# Patient Record
Sex: FEMALE | Race: WHITE | Employment: FULL TIME | URBAN - METROPOLITAN AREA
[De-identification: names, ages, dates, MRNs, and addresses within clinical notes are randomized per-mention and may not be internally consistent; named-entity substitution may affect disease eponyms.]

---

## 2021-09-11 ENCOUNTER — OFFICE VISIT (OUTPATIENT)
Dept: FAMILY MEDICINE CLINIC | Facility: CLINIC | Age: 46
End: 2021-09-11
Payer: COMMERCIAL

## 2021-09-11 ENCOUNTER — LAB ENCOUNTER (OUTPATIENT)
Dept: LAB | Age: 46
End: 2021-09-11
Attending: FAMILY MEDICINE
Payer: COMMERCIAL

## 2021-09-11 VITALS
DIASTOLIC BLOOD PRESSURE: 77 MMHG | BODY MASS INDEX: 28 KG/M2 | WEIGHT: 166 LBS | HEART RATE: 76 BPM | SYSTOLIC BLOOD PRESSURE: 139 MMHG | HEIGHT: 64.5 IN

## 2021-09-11 DIAGNOSIS — Z12.11 ENCOUNTER FOR SCREENING COLONOSCOPY: ICD-10-CM

## 2021-09-11 DIAGNOSIS — Z00.00 ANNUAL PHYSICAL EXAM: Primary | ICD-10-CM

## 2021-09-11 DIAGNOSIS — Z00.00 ANNUAL PHYSICAL EXAM: ICD-10-CM

## 2021-09-11 LAB
ALBUMIN SERPL-MCNC: 3.8 G/DL (ref 3.4–5)
ALBUMIN/GLOB SERPL: 1 {RATIO} (ref 1–2)
ALP LIVER SERPL-CCNC: 57 U/L
ALT SERPL-CCNC: 26 U/L
ANION GAP SERPL CALC-SCNC: 4 MMOL/L (ref 0–18)
AST SERPL-CCNC: 15 U/L (ref 15–37)
BASOPHILS # BLD AUTO: 0.05 X10(3) UL (ref 0–0.2)
BASOPHILS NFR BLD AUTO: 1 %
BILIRUB SERPL-MCNC: 0.4 MG/DL (ref 0.1–2)
BUN BLD-MCNC: 11 MG/DL (ref 7–18)
BUN/CREAT SERPL: 12.8 (ref 10–20)
CALCIUM BLD-MCNC: 9.3 MG/DL (ref 8.5–10.1)
CHLORIDE SERPL-SCNC: 106 MMOL/L (ref 98–112)
CHOLEST SMN-MCNC: 244 MG/DL (ref ?–200)
CO2 SERPL-SCNC: 28 MMOL/L (ref 21–32)
CREAT BLD-MCNC: 0.86 MG/DL
DEPRECATED RDW RBC AUTO: 49.4 FL (ref 35.1–46.3)
EOSINOPHIL # BLD AUTO: 0.25 X10(3) UL (ref 0–0.7)
EOSINOPHIL NFR BLD AUTO: 5.1 %
ERYTHROCYTE [DISTWIDTH] IN BLOOD BY AUTOMATED COUNT: 14.6 % (ref 11–15)
GLOBULIN PLAS-MCNC: 3.9 G/DL (ref 2.8–4.4)
GLUCOSE BLD-MCNC: 92 MG/DL (ref 70–99)
HCT VFR BLD AUTO: 40.2 %
HDLC SERPL-MCNC: 78 MG/DL (ref 40–59)
HGB BLD-MCNC: 13.2 G/DL
IMM GRANULOCYTES # BLD AUTO: 0.01 X10(3) UL (ref 0–1)
IMM GRANULOCYTES NFR BLD: 0.2 %
LDLC SERPL CALC-MCNC: 157 MG/DL (ref ?–100)
LYMPHOCYTES # BLD AUTO: 1.65 X10(3) UL (ref 1–4)
LYMPHOCYTES NFR BLD AUTO: 33.3 %
M PROTEIN MFR SERPL ELPH: 7.7 G/DL (ref 6.4–8.2)
MCH RBC QN AUTO: 29.8 PG (ref 26–34)
MCHC RBC AUTO-ENTMCNC: 32.8 G/DL (ref 31–37)
MCV RBC AUTO: 90.7 FL
MONOCYTES # BLD AUTO: 0.43 X10(3) UL (ref 0.1–1)
MONOCYTES NFR BLD AUTO: 8.7 %
NEUTROPHILS # BLD AUTO: 2.56 X10 (3) UL (ref 1.5–7.7)
NEUTROPHILS # BLD AUTO: 2.56 X10(3) UL (ref 1.5–7.7)
NEUTROPHILS NFR BLD AUTO: 51.7 %
NONHDLC SERPL-MCNC: 166 MG/DL (ref ?–130)
OSMOLALITY SERPL CALC.SUM OF ELEC: 285 MOSM/KG (ref 275–295)
PATIENT FASTING Y/N/NP: YES
PATIENT FASTING Y/N/NP: YES
PLATELET # BLD AUTO: 204 10(3)UL (ref 150–450)
POTASSIUM SERPL-SCNC: 4.9 MMOL/L (ref 3.5–5.1)
RBC # BLD AUTO: 4.43 X10(6)UL
RUBV IGG SER QL: POSITIVE
RUBV IGG SER-ACNC: 94.5 IU/ML (ref 10–?)
SODIUM SERPL-SCNC: 138 MMOL/L (ref 136–145)
TRIGL SERPL-MCNC: 56 MG/DL (ref 30–149)
TSI SER-ACNC: 1.18 MIU/ML (ref 0.36–3.74)
VLDLC SERPL CALC-MCNC: 11 MG/DL (ref 0–30)
WBC # BLD AUTO: 5 X10(3) UL (ref 4–11)

## 2021-09-11 PROCEDURE — 80061 LIPID PANEL: CPT

## 2021-09-11 PROCEDURE — 85025 COMPLETE CBC W/AUTO DIFF WBC: CPT

## 2021-09-11 PROCEDURE — 90471 IMMUNIZATION ADMIN: CPT | Performed by: FAMILY MEDICINE

## 2021-09-11 PROCEDURE — 3075F SYST BP GE 130 - 139MM HG: CPT | Performed by: FAMILY MEDICINE

## 2021-09-11 PROCEDURE — 99396 PREV VISIT EST AGE 40-64: CPT | Performed by: FAMILY MEDICINE

## 2021-09-11 PROCEDURE — 84443 ASSAY THYROID STIM HORMONE: CPT

## 2021-09-11 PROCEDURE — 80053 COMPREHEN METABOLIC PANEL: CPT

## 2021-09-11 PROCEDURE — 3078F DIAST BP <80 MM HG: CPT | Performed by: FAMILY MEDICINE

## 2021-09-11 PROCEDURE — 86762 RUBELLA ANTIBODY: CPT

## 2021-09-11 PROCEDURE — 86580 TB INTRADERMAL TEST: CPT | Performed by: FAMILY MEDICINE

## 2021-09-11 PROCEDURE — 86735 MUMPS ANTIBODY: CPT

## 2021-09-11 PROCEDURE — 86765 RUBEOLA ANTIBODY: CPT

## 2021-09-11 PROCEDURE — 86787 VARICELLA-ZOSTER ANTIBODY: CPT

## 2021-09-11 PROCEDURE — 36415 COLL VENOUS BLD VENIPUNCTURE: CPT

## 2021-09-11 PROCEDURE — 90715 TDAP VACCINE 7 YRS/> IM: CPT | Performed by: FAMILY MEDICINE

## 2021-09-11 PROCEDURE — 3008F BODY MASS INDEX DOCD: CPT | Performed by: FAMILY MEDICINE

## 2021-09-11 NOTE — PROGRESS NOTES
Subjective:   Patient ID: Jean-Paul Mcelroy is a 40year old female. Here for pre employment physical. Doing well no complaints. History/Other:   Review of Systems     Constitutional: Negative.   Negative for activity change, appetite change, diaphor (YRP=[id number])

## 2021-09-13 ENCOUNTER — NURSE ONLY (OUTPATIENT)
Dept: FAMILY MEDICINE CLINIC | Facility: CLINIC | Age: 46
End: 2021-09-13
Payer: COMMERCIAL

## 2021-09-13 DIAGNOSIS — Z11.1 SCREENING FOR TUBERCULOSIS: Primary | ICD-10-CM

## 2021-09-13 LAB
INDURATION (): 0 MM (ref 0–11)
MEV IGG SER-ACNC: 20.9 AU/ML (ref 16.5–?)
MUV IGG SER IA-ACNC: 20.9 AU/ML (ref 11–?)
VZV IGG SER IA-ACNC: 546.3 (ref 165–?)

## 2021-09-13 NOTE — PROGRESS NOTES
Patient is here for PPD reading. She has verbalized name and . Patient is here within 48 hours. Copies made and filed out form.

## 2021-09-25 ENCOUNTER — NURSE ONLY (OUTPATIENT)
Dept: FAMILY MEDICINE CLINIC | Facility: CLINIC | Age: 46
End: 2021-09-25
Payer: COMMERCIAL

## 2021-09-25 DIAGNOSIS — Z23 NEED FOR VACCINATION: Primary | ICD-10-CM

## 2021-09-25 PROCEDURE — 90471 IMMUNIZATION ADMIN: CPT | Performed by: STUDENT IN AN ORGANIZED HEALTH CARE EDUCATION/TRAINING PROGRAM

## 2021-09-25 PROCEDURE — 90707 MMR VACCINE SC: CPT | Performed by: STUDENT IN AN ORGANIZED HEALTH CARE EDUCATION/TRAINING PROGRAM

## 2022-11-18 ENCOUNTER — LAB ENCOUNTER (OUTPATIENT)
Dept: LAB | Age: 47
End: 2022-11-18
Attending: FAMILY MEDICINE
Payer: COMMERCIAL

## 2022-11-18 ENCOUNTER — OFFICE VISIT (OUTPATIENT)
Dept: FAMILY MEDICINE CLINIC | Facility: CLINIC | Age: 47
End: 2022-11-18
Payer: COMMERCIAL

## 2022-11-18 VITALS
DIASTOLIC BLOOD PRESSURE: 82 MMHG | WEIGHT: 166 LBS | TEMPERATURE: 98 F | BODY MASS INDEX: 28 KG/M2 | HEIGHT: 64.5 IN | HEART RATE: 67 BPM | SYSTOLIC BLOOD PRESSURE: 144 MMHG | OXYGEN SATURATION: 98 %

## 2022-11-18 DIAGNOSIS — Z00.00 ANNUAL PHYSICAL EXAM: ICD-10-CM

## 2022-11-18 DIAGNOSIS — Z00.00 ANNUAL PHYSICAL EXAM: Primary | ICD-10-CM

## 2022-11-18 DIAGNOSIS — I10 ESSENTIAL HYPERTENSION: ICD-10-CM

## 2022-11-18 DIAGNOSIS — Z12.11 SCREENING FOR COLON CANCER: ICD-10-CM

## 2022-11-18 LAB
ALBUMIN SERPL-MCNC: 3.7 G/DL (ref 3.4–5)
ALBUMIN/GLOB SERPL: 0.9 {RATIO} (ref 1–2)
ALP LIVER SERPL-CCNC: 77 U/L
ALT SERPL-CCNC: 89 U/L
ANION GAP SERPL CALC-SCNC: 4 MMOL/L (ref 0–18)
AST SERPL-CCNC: 36 U/L (ref 15–37)
BASOPHILS # BLD AUTO: 0.05 X10(3) UL (ref 0–0.2)
BASOPHILS NFR BLD AUTO: 0.9 %
BILIRUB SERPL-MCNC: 0.4 MG/DL (ref 0.1–2)
BUN BLD-MCNC: 17 MG/DL (ref 7–18)
BUN/CREAT SERPL: 21.3 (ref 10–20)
CALCIUM BLD-MCNC: 9.4 MG/DL (ref 8.5–10.1)
CHLORIDE SERPL-SCNC: 106 MMOL/L (ref 98–112)
CHOLEST SERPL-MCNC: 216 MG/DL (ref ?–200)
CO2 SERPL-SCNC: 29 MMOL/L (ref 21–32)
CREAT BLD-MCNC: 0.8 MG/DL
DEPRECATED RDW RBC AUTO: 47.6 FL (ref 35.1–46.3)
EOSINOPHIL # BLD AUTO: 0.16 X10(3) UL (ref 0–0.7)
EOSINOPHIL NFR BLD AUTO: 3 %
ERYTHROCYTE [DISTWIDTH] IN BLOOD BY AUTOMATED COUNT: 14.2 % (ref 11–15)
FASTING PATIENT LIPID ANSWER: YES
FASTING STATUS PATIENT QL REPORTED: YES
GFR SERPLBLD BASED ON 1.73 SQ M-ARVRAT: 92 ML/MIN/1.73M2 (ref 60–?)
GLOBULIN PLAS-MCNC: 3.9 G/DL (ref 2.8–4.4)
GLUCOSE BLD-MCNC: 95 MG/DL (ref 70–99)
HCT VFR BLD AUTO: 39.1 %
HDLC SERPL-MCNC: 96 MG/DL (ref 40–59)
HGB BLD-MCNC: 12.8 G/DL
IMM GRANULOCYTES # BLD AUTO: 0.01 X10(3) UL (ref 0–1)
IMM GRANULOCYTES NFR BLD: 0.2 %
LDLC SERPL CALC-MCNC: 113 MG/DL (ref ?–100)
LYMPHOCYTES # BLD AUTO: 1.55 X10(3) UL (ref 1–4)
LYMPHOCYTES NFR BLD AUTO: 28.8 %
MCH RBC QN AUTO: 29.9 PG (ref 26–34)
MCHC RBC AUTO-ENTMCNC: 32.7 G/DL (ref 31–37)
MCV RBC AUTO: 91.4 FL
MONOCYTES # BLD AUTO: 0.4 X10(3) UL (ref 0.1–1)
MONOCYTES NFR BLD AUTO: 7.4 %
NEUTROPHILS # BLD AUTO: 3.22 X10 (3) UL (ref 1.5–7.7)
NEUTROPHILS # BLD AUTO: 3.22 X10(3) UL (ref 1.5–7.7)
NEUTROPHILS NFR BLD AUTO: 59.7 %
NONHDLC SERPL-MCNC: 120 MG/DL (ref ?–130)
OSMOLALITY SERPL CALC.SUM OF ELEC: 289 MOSM/KG (ref 275–295)
PLATELET # BLD AUTO: 230 10(3)UL (ref 150–450)
POTASSIUM SERPL-SCNC: 4.2 MMOL/L (ref 3.5–5.1)
PROT SERPL-MCNC: 7.6 G/DL (ref 6.4–8.2)
RBC # BLD AUTO: 4.28 X10(6)UL
SODIUM SERPL-SCNC: 139 MMOL/L (ref 136–145)
TRIGL SERPL-MCNC: 36 MG/DL (ref 30–149)
VLDLC SERPL CALC-MCNC: 6 MG/DL (ref 0–30)
WBC # BLD AUTO: 5.4 X10(3) UL (ref 4–11)

## 2022-11-18 PROCEDURE — 99396 PREV VISIT EST AGE 40-64: CPT | Performed by: FAMILY MEDICINE

## 2022-11-18 PROCEDURE — 3079F DIAST BP 80-89 MM HG: CPT | Performed by: FAMILY MEDICINE

## 2022-11-18 PROCEDURE — 90686 IIV4 VACC NO PRSV 0.5 ML IM: CPT | Performed by: FAMILY MEDICINE

## 2022-11-18 PROCEDURE — 80061 LIPID PANEL: CPT

## 2022-11-18 PROCEDURE — 36415 COLL VENOUS BLD VENIPUNCTURE: CPT

## 2022-11-18 PROCEDURE — 3077F SYST BP >= 140 MM HG: CPT | Performed by: FAMILY MEDICINE

## 2022-11-18 PROCEDURE — 90471 IMMUNIZATION ADMIN: CPT | Performed by: FAMILY MEDICINE

## 2022-11-18 PROCEDURE — 3008F BODY MASS INDEX DOCD: CPT | Performed by: FAMILY MEDICINE

## 2022-11-18 PROCEDURE — 85025 COMPLETE CBC W/AUTO DIFF WBC: CPT

## 2022-11-18 PROCEDURE — 80053 COMPREHEN METABOLIC PANEL: CPT

## 2022-11-18 NOTE — PROGRESS NOTES
Subjective:   Patient ID: Mary Stevens is a 56year old female. Here for annual physical   otehrwise feeling well       History/Other:   Review of Systems     Constitutional: Negative. Negative for activity change, appetite change, diaphoresis and fatigue. Respiratory: Negative. Negative for apnea, cough, chest tightness and shortness of breath. Cardiovascular: Negative. Negative for chest pain, palpitations and leg swelling. Gastrointestinal: Negative. Negative for abdominal pain. Skin: Negative. Psychiatric/Behavioral: Negative. Allergies:Not on File    Objective:   Physical Exam  Constitutional:       General: She is not in acute distress. Appearance: She is well-developed. HENT:      Head: Normocephalic. Eyes:      General:         Right eye: No discharge. Left eye: No discharge. Neck:      Thyroid: No thyromegaly. Vascular: No JVD. Cardiovascular:      Rate and Rhythm: Normal rate. Heart sounds: Normal heart sounds. Pulmonary:      Effort: No respiratory distress. Breath sounds: Normal breath sounds. No wheezing. Chest:      Chest wall: No tenderness. Abdominal:      General: Bowel sounds are normal. There is no distension. Palpations: Abdomen is soft. There is no mass. Tenderness: There is no abdominal tenderness. There is no guarding or rebound. Hernia: There is no hernia in the left inguinal area. Genitourinary:     Vagina: Normal. No vaginal discharge. Cervix: No cervical motion tenderness, discharge or friability. Uterus: Not deviated, not enlarged, not fixed and not tender. Adnexa:         Right: No mass, tenderness or fullness. Left: No mass, tenderness or fullness. Musculoskeletal:         General: Normal range of motion. Cervical back: Neck supple. Skin:     General: Skin is warm and dry.    Neurological:      Mental Status: She is alert and oriented to person, place, and time.      Deep Tendon Reflexes: Reflexes are normal and symmetric. Psychiatric:         Behavior: Behavior normal.         Thought Content:  Thought content normal.         Assessment & Plan:   Annual physical exam  (primary encounter diagnosis)  Screening for colon cancer  Hypertension   Diet and exercise recommended   F/u in  1month   Orders Placed This Encounter      Comp Metabolic Panel (14)      Lipid Panel      CBC With Differential With Platelet      Hpv Dna  High Risk , Thin Prep Collect      Flulaval 6 months and older 0.5 ml PFS [99996]      ThinPrep PAP Smear      Meds This Visit:  Requested Prescriptions      No prescriptions requested or ordered in this encounter       Imaging & Referrals:  FLULAVAL INFLUENZA VACCINE QUAD PRESERVATIVE FREE 0.5 ML  OP REFERRAL TO [facility] GI TELEPHONE COLON SCREEN  GRETA SCREENING BILAT (CPT=77067)

## 2022-11-21 LAB — HPV I/H RISK 1 DNA SPEC QL NAA+PROBE: NEGATIVE

## 2022-11-28 ENCOUNTER — TELEPHONE (OUTPATIENT)
Dept: FAMILY MEDICINE CLINIC | Facility: CLINIC | Age: 47
End: 2022-11-28

## 2022-11-28 DIAGNOSIS — R79.89 ABNORMAL LFTS: Primary | ICD-10-CM

## 2022-12-03 ENCOUNTER — HOSPITAL ENCOUNTER (OUTPATIENT)
Dept: MAMMOGRAPHY | Age: 47
Discharge: HOME | End: 2022-12-03
Attending: FAMILY MEDICINE
Payer: COMMERCIAL

## 2022-12-03 DIAGNOSIS — Z00.00 ANNUAL PHYSICAL EXAM: ICD-10-CM

## 2022-12-03 PROCEDURE — 77067 SCR MAMMO BI INCL CAD: CPT | Performed by: FAMILY MEDICINE

## 2022-12-03 PROCEDURE — 77063 BREAST TOMOSYNTHESIS BI: CPT | Performed by: FAMILY MEDICINE

## 2022-12-07 ENCOUNTER — HOSPITAL ENCOUNTER (OUTPATIENT)
Dept: MAMMOGRAPHY | Facility: HOSPITAL | Age: 47
Discharge: HOME | End: 2022-12-07
Attending: FAMILY MEDICINE
Payer: COMMERCIAL

## 2022-12-07 ENCOUNTER — HOSPITAL ENCOUNTER (OUTPATIENT)
Dept: ULTRASOUND IMAGING | Facility: HOSPITAL | Age: 47
Discharge: HOME | End: 2022-12-07
Attending: FAMILY MEDICINE
Payer: COMMERCIAL

## 2022-12-07 DIAGNOSIS — R92.8 ABNORMAL MAMMOGRAM: ICD-10-CM

## 2022-12-07 PROCEDURE — 77061 BREAST TOMOSYNTHESIS UNI: CPT | Performed by: FAMILY MEDICINE

## 2022-12-07 PROCEDURE — 77065 DX MAMMO INCL CAD UNI: CPT | Performed by: FAMILY MEDICINE

## 2022-12-07 PROCEDURE — 76642 ULTRASOUND BREAST LIMITED: CPT | Performed by: FAMILY MEDICINE

## 2022-12-08 ENCOUNTER — TELEPHONE (OUTPATIENT)
Dept: FAMILY MEDICINE CLINIC | Facility: CLINIC | Age: 47
End: 2022-12-08

## 2022-12-08 DIAGNOSIS — R92.8 ABNORMAL MAMMOGRAM: Primary | ICD-10-CM

## 2022-12-16 ENCOUNTER — LAB ENCOUNTER (OUTPATIENT)
Dept: LAB | Age: 47
End: 2022-12-16
Attending: FAMILY MEDICINE
Payer: COMMERCIAL

## 2022-12-16 ENCOUNTER — OFFICE VISIT (OUTPATIENT)
Dept: FAMILY MEDICINE CLINIC | Facility: CLINIC | Age: 47
End: 2022-12-16
Payer: COMMERCIAL

## 2022-12-16 VITALS
SYSTOLIC BLOOD PRESSURE: 137 MMHG | HEIGHT: 64.5 IN | DIASTOLIC BLOOD PRESSURE: 87 MMHG | WEIGHT: 168 LBS | BODY MASS INDEX: 28.33 KG/M2 | HEART RATE: 80 BPM

## 2022-12-16 DIAGNOSIS — R79.89 ABNORMAL LFTS: ICD-10-CM

## 2022-12-16 DIAGNOSIS — R39.89 URINARY PROBLEM: ICD-10-CM

## 2022-12-16 DIAGNOSIS — R79.89 ABNORMAL LFTS: Primary | ICD-10-CM

## 2022-12-16 DIAGNOSIS — R89.9 ABNORMAL LABORATORY TEST: ICD-10-CM

## 2022-12-16 LAB
ALBUMIN SERPL-MCNC: 3.9 G/DL (ref 3.4–5)
ALP LIVER SERPL-CCNC: 85 U/L
ALT SERPL-CCNC: 96 U/L
AST SERPL-CCNC: 41 U/L (ref 15–37)
BILIRUB DIRECT SERPL-MCNC: <0.1 MG/DL (ref 0–0.2)
BILIRUB SERPL-MCNC: 0.3 MG/DL (ref 0.1–2)
HCV AB SERPL QL IA: NONREACTIVE
PROT SERPL-MCNC: 8.3 G/DL (ref 6.4–8.2)

## 2022-12-16 PROCEDURE — 80076 HEPATIC FUNCTION PANEL: CPT

## 2022-12-16 PROCEDURE — 3079F DIAST BP 80-89 MM HG: CPT | Performed by: FAMILY MEDICINE

## 2022-12-16 PROCEDURE — 3075F SYST BP GE 130 - 139MM HG: CPT | Performed by: FAMILY MEDICINE

## 2022-12-16 PROCEDURE — 86038 ANTINUCLEAR ANTIBODIES: CPT

## 2022-12-16 PROCEDURE — 3008F BODY MASS INDEX DOCD: CPT | Performed by: FAMILY MEDICINE

## 2022-12-16 PROCEDURE — 36415 COLL VENOUS BLD VENIPUNCTURE: CPT

## 2022-12-16 PROCEDURE — 84466 ASSAY OF TRANSFERRIN: CPT

## 2022-12-16 PROCEDURE — 86803 HEPATITIS C AB TEST: CPT

## 2022-12-16 PROCEDURE — 99213 OFFICE O/P EST LOW 20 MIN: CPT | Performed by: FAMILY MEDICINE

## 2022-12-16 PROCEDURE — 86225 DNA ANTIBODY NATIVE: CPT

## 2022-12-16 PROCEDURE — 83540 ASSAY OF IRON: CPT

## 2022-12-16 PROCEDURE — 86706 HEP B SURFACE ANTIBODY: CPT

## 2022-12-16 NOTE — PROGRESS NOTES
Subjective:   Patient ID: Asuncion Mcnulty is a 56year old female. HPI  Patient here for f/u blood test   Has elevated lfts/   Denies any abdominal pain   Not taking tylenol or drinking alcohol  Also complains about flank pain for quite some time reated to urination   She states that if she does not urinate pain gets worse. History/Other:   Review of Systems     Constitutional: Negative. Negative for activity change, appetite change, diaphoresis and fatigue. Respiratory: Negative. Negative for apnea, cough, chest tightness and shortness of breath. Cardiovascular: Negative. Negative for chest pain, palpitations and leg swelling. Gastrointestinal: Negative. Negative for abdominal pain. Back see hpi     No current outpatient medications on file. Allergies:No Known Allergies    Objective:   Physical Exam  Constitutional:       Appearance: She is well-developed. Cardiovascular:      Rate and Rhythm: Normal rate and regular rhythm. Heart sounds: Normal heart sounds. Pulmonary:      Effort: Pulmonary effort is normal.      Breath sounds: Normal breath sounds. Abdominal:      General: Bowel sounds are normal.      Palpations: Abdomen is soft. Comments: Tenderness in the flank area   Neurological:      Mental Status: She is alert. Deep Tendon Reflexes: Reflexes are normal and symmetric.          Assessment & Plan:   Abnormal LFTs  (primary encounter diagnosis)  Urinary problem  Repeat testing   Us kidney   F/u with results   Discussed about different reasons of the elevated lfts  Orders Placed This Encounter      HCV Antibody [E]      Hepatic Function Panel (7) [E]      Meds This Visit:  Requested Prescriptions      No prescriptions requested or ordered in this encounter       Imaging & Referrals:  US KIDNEYS (DNQ=[id number])

## 2022-12-17 ENCOUNTER — TELEPHONE (OUTPATIENT)
Dept: FAMILY MEDICINE CLINIC | Facility: CLINIC | Age: 47
End: 2022-12-17

## 2022-12-17 DIAGNOSIS — R89.9 ABNORMAL LABORATORY TEST: Primary | ICD-10-CM

## 2022-12-17 LAB
HBV SURFACE AB SER QL: NONREACTIVE
HBV SURFACE AB SERPL IA-ACNC: <3.1 MIU/ML
IRON SATN MFR SERPL: 8 %
IRON SERPL-MCNC: 35 UG/DL
TIBC SERPL-MCNC: 450 UG/DL (ref 240–450)
TRANSFERRIN SERPL-MCNC: 302 MG/DL (ref 200–360)

## 2022-12-19 LAB
DSDNA IGG SERPL IA-ACNC: 1.5 IU/ML
ENA AB SER QL IA: 0.1 UG/L
ENA AB SER QL IA: NEGATIVE

## 2022-12-20 ENCOUNTER — TELEPHONE (OUTPATIENT)
Dept: FAMILY MEDICINE CLINIC | Facility: CLINIC | Age: 47
End: 2022-12-20

## 2022-12-20 DIAGNOSIS — R79.89 ABNORMAL LFTS: Primary | ICD-10-CM

## 2023-01-14 ENCOUNTER — HOSPITAL ENCOUNTER (OUTPATIENT)
Dept: ULTRASOUND IMAGING | Age: 48
Discharge: HOME | End: 2023-01-14
Attending: FAMILY MEDICINE
Payer: COMMERCIAL

## 2023-01-14 DIAGNOSIS — R39.89 URINARY PROBLEM: ICD-10-CM

## 2023-01-14 PROCEDURE — 76775 US EXAM ABDO BACK WALL LIM: CPT | Performed by: FAMILY MEDICINE

## 2023-06-16 ENCOUNTER — HOSPITAL ENCOUNTER (OUTPATIENT)
Dept: ULTRASOUND IMAGING | Facility: HOSPITAL | Age: 48
Discharge: HOME | End: 2023-06-16
Attending: FAMILY MEDICINE
Payer: COMMERCIAL

## 2023-06-16 ENCOUNTER — HOSPITAL ENCOUNTER (OUTPATIENT)
Dept: MAMMOGRAPHY | Facility: HOSPITAL | Age: 48
Discharge: HOME | End: 2023-06-16
Attending: FAMILY MEDICINE
Payer: COMMERCIAL

## 2023-06-16 DIAGNOSIS — R92.8 ABNORMAL MAMMOGRAM: ICD-10-CM

## 2023-06-16 PROCEDURE — 77065 DX MAMMO INCL CAD UNI: CPT | Performed by: FAMILY MEDICINE

## 2023-06-16 PROCEDURE — 76642 ULTRASOUND BREAST LIMITED: CPT | Performed by: FAMILY MEDICINE

## 2023-06-16 PROCEDURE — 77061 BREAST TOMOSYNTHESIS UNI: CPT | Performed by: FAMILY MEDICINE

## 2023-07-05 ENCOUNTER — TELEPHONE (OUTPATIENT)
Dept: FAMILY MEDICINE CLINIC | Facility: CLINIC | Age: 48
End: 2023-07-05

## 2023-07-05 DIAGNOSIS — R92.8 ABNORMAL MAMMOGRAM: Primary | ICD-10-CM

## 2023-07-14 ENCOUNTER — TELEPHONE (OUTPATIENT)
Dept: FAMILY MEDICINE CLINIC | Facility: CLINIC | Age: 48
End: 2023-07-14

## 2023-07-15 ENCOUNTER — WALK IN (OUTPATIENT)
Dept: URGENT CARE | Age: 48
End: 2023-07-15

## 2023-07-15 VITALS
TEMPERATURE: 98.3 F | WEIGHT: 192.68 LBS | HEIGHT: 65 IN | HEART RATE: 86 BPM | RESPIRATION RATE: 16 BRPM | BODY MASS INDEX: 32.1 KG/M2 | DIASTOLIC BLOOD PRESSURE: 58 MMHG | OXYGEN SATURATION: 98 % | SYSTOLIC BLOOD PRESSURE: 110 MMHG

## 2023-07-15 DIAGNOSIS — J01.10 ACUTE NON-RECURRENT FRONTAL SINUSITIS: Primary | ICD-10-CM

## 2023-07-15 PROCEDURE — 99211 OFF/OP EST MAY X REQ PHY/QHP: CPT | Performed by: NURSE PRACTITIONER

## 2023-07-15 RX ORDER — BENZONATATE 100 MG/1
100 CAPSULE ORAL 3 TIMES DAILY PRN
Qty: 20 CAPSULE | Refills: 0 | Status: SHIPPED | OUTPATIENT
Start: 2023-07-15

## 2023-07-15 RX ORDER — AMOXICILLIN AND CLAVULANATE POTASSIUM 875; 125 MG/1; MG/1
1 TABLET, FILM COATED ORAL 2 TIMES DAILY
Qty: 20 TABLET | Refills: 0 | Status: SHIPPED | OUTPATIENT
Start: 2023-07-15 | End: 2023-07-25

## 2023-07-15 ASSESSMENT — ENCOUNTER SYMPTOMS
SINUS PRESSURE: 1
SINUS PAIN: 1
PSYCHIATRIC NEGATIVE: 1
COUGH: 1
HEADACHES: 1
EYES NEGATIVE: 1
ENDOCRINE NEGATIVE: 1
CONSTITUTIONAL NEGATIVE: 1
HEMATOLOGIC/LYMPHATIC NEGATIVE: 1
RHINORRHEA: 1
SORE THROAT: 1
GASTROINTESTINAL NEGATIVE: 1

## 2023-07-29 ENCOUNTER — LAB ENCOUNTER (OUTPATIENT)
Dept: LAB | Age: 48
End: 2023-07-29
Attending: FAMILY MEDICINE
Payer: COMMERCIAL

## 2023-07-29 DIAGNOSIS — R89.9 ABNORMAL LABORATORY TEST: ICD-10-CM

## 2023-07-29 DIAGNOSIS — R79.89 ABNORMAL LFTS: ICD-10-CM

## 2023-07-29 LAB
ALBUMIN SERPL-MCNC: 3.7 G/DL (ref 3.4–5)
ALP LIVER SERPL-CCNC: 60 U/L
ALT SERPL-CCNC: 30 U/L
AST SERPL-CCNC: 21 U/L (ref 15–37)
BILIRUB DIRECT SERPL-MCNC: 0.1 MG/DL (ref 0–0.2)
BILIRUB SERPL-MCNC: 0.3 MG/DL (ref 0.1–2)
DEPRECATED HBV CORE AB SER IA-ACNC: 89.3 NG/ML
PROT SERPL-MCNC: 8.1 G/DL (ref 6.4–8.2)

## 2023-07-29 PROCEDURE — 80076 HEPATIC FUNCTION PANEL: CPT

## 2023-07-29 PROCEDURE — 82728 ASSAY OF FERRITIN: CPT

## 2023-07-29 PROCEDURE — 36415 COLL VENOUS BLD VENIPUNCTURE: CPT

## 2023-08-04 ENCOUNTER — LAB ENCOUNTER (OUTPATIENT)
Dept: LAB | Age: 48
End: 2023-08-04
Attending: FAMILY MEDICINE
Payer: COMMERCIAL

## 2023-08-04 ENCOUNTER — OFFICE VISIT (OUTPATIENT)
Dept: FAMILY MEDICINE CLINIC | Facility: CLINIC | Age: 48
End: 2023-08-04

## 2023-08-04 VITALS
BODY MASS INDEX: 27.55 KG/M2 | OXYGEN SATURATION: 97 % | SYSTOLIC BLOOD PRESSURE: 134 MMHG | HEIGHT: 64.5 IN | RESPIRATION RATE: 14 BRPM | DIASTOLIC BLOOD PRESSURE: 72 MMHG | WEIGHT: 163.38 LBS | HEART RATE: 65 BPM

## 2023-08-04 DIAGNOSIS — R53.83 OTHER FATIGUE: ICD-10-CM

## 2023-08-04 DIAGNOSIS — N92.6 MENSTRUAL DISORDER: ICD-10-CM

## 2023-08-04 DIAGNOSIS — Z12.11 SCREENING FOR COLON CANCER: Primary | ICD-10-CM

## 2023-08-04 LAB
HCG SERPL QL: NEGATIVE
TSI SER-ACNC: 1.43 MIU/ML (ref 0.36–3.74)

## 2023-08-04 PROCEDURE — 99213 OFFICE O/P EST LOW 20 MIN: CPT | Performed by: FAMILY MEDICINE

## 2023-08-04 PROCEDURE — 84703 CHORIONIC GONADOTROPIN ASSAY: CPT

## 2023-08-04 PROCEDURE — 36415 COLL VENOUS BLD VENIPUNCTURE: CPT

## 2023-08-04 PROCEDURE — 84443 ASSAY THYROID STIM HORMONE: CPT

## 2023-08-04 PROCEDURE — 3075F SYST BP GE 130 - 139MM HG: CPT | Performed by: FAMILY MEDICINE

## 2023-08-04 PROCEDURE — 3078F DIAST BP <80 MM HG: CPT | Performed by: FAMILY MEDICINE

## 2023-08-04 PROCEDURE — 3008F BODY MASS INDEX DOCD: CPT | Performed by: FAMILY MEDICINE

## 2023-08-04 NOTE — PROGRESS NOTES
Subjective:   Patient ID: Katie Wolf is a 53year old female. HPI  Patient here for f/u mammogram   Had abnormal mammogram needs repeat in 6 months   Also needs breast exam   Does not feel any mass -she felt something in the past but that's gone   Also feels tired more lately   History/Other:   Review of Systems    Constitutional: Negative. Negative for activity change, appetite change, diaphoresis and fatigue. Breast see hpi   Respiratory: Negative. Negative for apnea, cough, chest tightness and shortness of breath. Cardiovascular: Negative. Negative for chest pain, palpitations and leg swelling. No current outpatient medications on file. Allergies:No Known Allergies    Objective:   Physical Exam  Constitutional:       Appearance: She is well-developed. Cardiovascular:      Rate and Rhythm: Normal rate and regular rhythm. Heart sounds: Normal heart sounds. Pulmonary:      Effort: Pulmonary effort is normal.      Breath sounds: Normal breath sounds. Genitourinary:     Comments: Breast exam no masses no lymphadenopathy  Neurological:      Mental Status: She is alert. Deep Tendon Reflexes: Reflexes are normal and symmetric.          Assessment & Plan:   Screening for colon cancer  (primary encounter diagnosis)  Other fatigue  Menstrual disorder  Repeat mammo in 6 months   Orders Placed This Encounter      Assay, Thyroid Stim Hormone      HCG, Beta Subunit, Qual [E]      Meds This Visit:  Requested Prescriptions      No prescriptions requested or ordered in this encounter       Imaging & Referrals:  OP REFERRAL TO [facility] GI TELEPHONE COLON SCREEN

## 2023-11-27 ENCOUNTER — TELEMEDICINE (OUTPATIENT)
Dept: TELEHEALTH | Age: 48
End: 2023-11-27
Payer: COMMERCIAL

## 2023-11-27 DIAGNOSIS — R51.9 HEADACHE, UNSPECIFIED HEADACHE TYPE: Primary | ICD-10-CM

## 2023-11-27 DIAGNOSIS — R50.9 FEVER, UNSPECIFIED FEVER CAUSE: ICD-10-CM

## 2023-11-27 NOTE — PROGRESS NOTES
Virtual/Telephone Check-In    Carina Longoria verbally consents to a Virtual/Telephone Check-In service on 11/27/23. Patient has been referred to the [facility] website at [email or web address] to review the yearly Consent to Treat document. Patient understands and accepts financial responsibility for any deductible, co-insurance and/or co-pays associated with this service. Telehealth Verbal Consent   I conducted a telehealth visit with Carina Longoria today, 11/27/23, which was completed using two-way, real-time interactive audio and video communication. This has been done in good debbie to provide continuity of care in the best interest of the provider-patient relationship, due to the COVID -19 public health crisis/national emergency where restrictions of face-to-face office visits are ongoing. Every conscious effort was taken to allow for sufficient and adequate time to complete the visit. The patient was made aware of the limitations of the telehealth visit, including treatment limitations as no physical exam could be performed. The patient was advised to call 911 or to go to the ER in case there was an emergency. The patient was also advised of the potential privacy & security concerns related to the telehealth platform. The patient was made aware of where to find [facility] notice of privacy practices, telehealth consent form and other related consent forms and documents. which are located on the [facility] website. The patient verbally agreed to telehealth consent form, related consents and the risks discussed. Lastly, the patient confirmed that they were in [place]. Included in this visit, time may have been spent reviewing labs, medications, radiology tests and decision making. Appropriate medical decision-making and tests are ordered as detailed in the plan of care above. Coding/billing information is submitted for this visit based on complexity of care and/or time spent for the visit.     CHIEF COMPLAINT:  Chief Complaint   Patient presents with    Fever       HPI:  Lisa Martínez is a 53year old female who presents for a video visit. Patient reports fever 102F, headache, body aches, chills, sweats and neck pain. Onset last night into this AM. Reports some nasal congestion and cough but states she always has that. Patient has tried Advil for symptoms about a half hour ago. No current outpatient medications on file. No past medical history on file. No past surgical history on file. Social History     Socioeconomic History    Marital status:    Tobacco Use    Smoking status: Former    Smokeless tobacco: Never   Vaping Use    Vaping Use: Never used   Substance and Sexual Activity    Alcohol use: Yes    Drug use: Never        REVIEW OF SYSTEMS:  GENERAL: ok appetite  SKIN: no rashes or abnormal skin lesions  HEENT: See HPI  LUNGS: denies shortness of breath or wheezing, See HPI  CARDIOVASCULAR: denies chest pain or palpitations   GI: denies N/V/C or abdominal pain  NEURO: +headaches    EXAM:  General: Alert, Well-appearing, and In no acute distress  Respiratory:   Speaking in full sentences comfortably  Normal work of breathing  No cough during visit  Head: Normocephalic  Neck: FROM, able to touch chin to chest with no issues  Nose: No obvious nasal discharge. Skin: No obvious rashes or lesions from what observed. No results found for this or any previous visit (from the past 24 hour(s)). ASSESSMENT AND PLAN:  Lias Martínez is a 53year old female who presents with symptoms that are consistent with    ASSESSMENT:   Encounter Diagnoses   Name Primary? Headache, unspecified headache type Yes    Fever, unspecified fever cause        PLAN: Based on symptoms and timing for possible antiviral tx, Advised to go into  for further eval and rapid flu and rapid covid testing. PT would like to go to [facility], discussed hours.     The patient indicates understanding of these issues and agrees to the plan. Nory King understands video visit evaluation is not a substitute for face-to-face examination or emergency care. Patient advised to go to ER or call 911 for worsening symptoms or acute distress.

## 2023-12-12 ENCOUNTER — LAB ENCOUNTER (OUTPATIENT)
Dept: LAB | Age: 48
End: 2023-12-12
Attending: FAMILY MEDICINE
Payer: COMMERCIAL

## 2023-12-12 ENCOUNTER — OFFICE VISIT (OUTPATIENT)
Dept: FAMILY MEDICINE CLINIC | Facility: CLINIC | Age: 48
End: 2023-12-12
Payer: COMMERCIAL

## 2023-12-12 VITALS
SYSTOLIC BLOOD PRESSURE: 127 MMHG | OXYGEN SATURATION: 96 % | BODY MASS INDEX: 33.73 KG/M2 | HEART RATE: 83 BPM | WEIGHT: 200 LBS | DIASTOLIC BLOOD PRESSURE: 80 MMHG | HEIGHT: 64.5 IN

## 2023-12-12 DIAGNOSIS — R42 DIZZINESS: ICD-10-CM

## 2023-12-12 DIAGNOSIS — Z00.00 ANNUAL PHYSICAL EXAM: Primary | ICD-10-CM

## 2023-12-12 DIAGNOSIS — Z00.00 ANNUAL PHYSICAL EXAM: ICD-10-CM

## 2023-12-12 DIAGNOSIS — Z12.11 SCREENING FOR COLON CANCER: ICD-10-CM

## 2023-12-12 LAB
ALBUMIN SERPL-MCNC: 4.5 G/DL (ref 3.2–4.8)
ALBUMIN/GLOB SERPL: 1.5 {RATIO} (ref 1–2)
ALP LIVER SERPL-CCNC: 78 U/L
ALT SERPL-CCNC: 35 U/L
ANION GAP SERPL CALC-SCNC: 4 MMOL/L (ref 0–18)
AST SERPL-CCNC: 19 U/L (ref ?–34)
ATRIAL RATE: 79 BPM
BASOPHILS # BLD AUTO: 0.06 X10(3) UL (ref 0–0.2)
BASOPHILS NFR BLD AUTO: 0.9 %
BILIRUB SERPL-MCNC: 0.5 MG/DL (ref 0.3–1.2)
BUN BLD-MCNC: 15 MG/DL (ref 9–23)
BUN/CREAT SERPL: 16.9 (ref 10–20)
CALCIUM BLD-MCNC: 9.6 MG/DL (ref 8.7–10.4)
CHLORIDE SERPL-SCNC: 104 MMOL/L (ref 98–112)
CHOLEST SERPL-MCNC: 247 MG/DL (ref ?–200)
CO2 SERPL-SCNC: 29 MMOL/L (ref 21–32)
CREAT BLD-MCNC: 0.89 MG/DL
DEPRECATED RDW RBC AUTO: 45.3 FL (ref 35.1–46.3)
EGFRCR SERPLBLD CKD-EPI 2021: 80 ML/MIN/1.73M2 (ref 60–?)
EOSINOPHIL # BLD AUTO: 0.17 X10(3) UL (ref 0–0.7)
EOSINOPHIL NFR BLD AUTO: 2.4 %
ERYTHROCYTE [DISTWIDTH] IN BLOOD BY AUTOMATED COUNT: 14.4 % (ref 11–15)
FASTING PATIENT LIPID ANSWER: NO
FASTING STATUS PATIENT QL REPORTED: NO
GLOBULIN PLAS-MCNC: 3.1 G/DL (ref 2.8–4.4)
GLUCOSE BLD-MCNC: 94 MG/DL (ref 70–99)
HCT VFR BLD AUTO: 38.5 %
HDLC SERPL-MCNC: 68 MG/DL (ref 40–59)
HGB BLD-MCNC: 13.1 G/DL
IMM GRANULOCYTES # BLD AUTO: 0.02 X10(3) UL (ref 0–1)
IMM GRANULOCYTES NFR BLD: 0.3 %
LDLC SERPL CALC-MCNC: 166 MG/DL (ref ?–100)
LYMPHOCYTES # BLD AUTO: 1.63 X10(3) UL (ref 1–4)
LYMPHOCYTES NFR BLD AUTO: 23.4 %
MCH RBC QN AUTO: 29.3 PG (ref 26–34)
MCHC RBC AUTO-ENTMCNC: 34 G/DL (ref 31–37)
MCV RBC AUTO: 86.1 FL
MONOCYTES # BLD AUTO: 0.59 X10(3) UL (ref 0.1–1)
MONOCYTES NFR BLD AUTO: 8.5 %
NEUTROPHILS # BLD AUTO: 4.5 X10 (3) UL (ref 1.5–7.7)
NEUTROPHILS # BLD AUTO: 4.5 X10(3) UL (ref 1.5–7.7)
NEUTROPHILS NFR BLD AUTO: 64.5 %
NONHDLC SERPL-MCNC: 179 MG/DL (ref ?–130)
OSMOLALITY SERPL CALC.SUM OF ELEC: 285 MOSM/KG (ref 275–295)
P AXIS: 51 DEGREES
P-R INTERVAL: 148 MS
PLATELET # BLD AUTO: 325 10(3)UL (ref 150–450)
POTASSIUM SERPL-SCNC: 3.9 MMOL/L (ref 3.5–5.1)
PROT SERPL-MCNC: 7.6 G/DL (ref 5.7–8.2)
Q-T INTERVAL: 388 MS
QRS DURATION: 86 MS
QTC CALCULATION (BEZET): 444 MS
R AXIS: 55 DEGREES
RBC # BLD AUTO: 4.47 X10(6)UL
SODIUM SERPL-SCNC: 137 MMOL/L (ref 136–145)
T AXIS: 66 DEGREES
TRIGL SERPL-MCNC: 75 MG/DL (ref 30–149)
VENTRICULAR RATE: 79 BPM
VIT B12 SERPL-MCNC: 892 PG/ML (ref 211–911)
VIT D+METAB SERPL-MCNC: 26.7 NG/ML (ref 30–100)
VLDLC SERPL CALC-MCNC: 15 MG/DL (ref 0–30)
WBC # BLD AUTO: 7 X10(3) UL (ref 4–11)

## 2023-12-12 PROCEDURE — 80061 LIPID PANEL: CPT

## 2023-12-12 PROCEDURE — 85025 COMPLETE CBC W/AUTO DIFF WBC: CPT

## 2023-12-12 PROCEDURE — 82607 VITAMIN B-12: CPT

## 2023-12-12 PROCEDURE — 3079F DIAST BP 80-89 MM HG: CPT | Performed by: FAMILY MEDICINE

## 2023-12-12 PROCEDURE — 99396 PREV VISIT EST AGE 40-64: CPT | Performed by: FAMILY MEDICINE

## 2023-12-12 PROCEDURE — 80053 COMPREHEN METABOLIC PANEL: CPT

## 2023-12-12 PROCEDURE — 3008F BODY MASS INDEX DOCD: CPT | Performed by: FAMILY MEDICINE

## 2023-12-12 PROCEDURE — 90471 IMMUNIZATION ADMIN: CPT | Performed by: FAMILY MEDICINE

## 2023-12-12 PROCEDURE — 93005 ELECTROCARDIOGRAM TRACING: CPT

## 2023-12-12 PROCEDURE — 93010 ELECTROCARDIOGRAM REPORT: CPT | Performed by: STUDENT IN AN ORGANIZED HEALTH CARE EDUCATION/TRAINING PROGRAM

## 2023-12-12 PROCEDURE — 3074F SYST BP LT 130 MM HG: CPT | Performed by: FAMILY MEDICINE

## 2023-12-12 PROCEDURE — 36415 COLL VENOUS BLD VENIPUNCTURE: CPT

## 2023-12-12 PROCEDURE — 82306 VITAMIN D 25 HYDROXY: CPT

## 2023-12-12 PROCEDURE — 90686 IIV4 VACC NO PRSV 0.5 ML IM: CPT | Performed by: FAMILY MEDICINE

## 2023-12-12 NOTE — PROGRESS NOTES
Subjective:   Patient ID: Day Husbands is a 53year old female. Here for annual physical   Also feels slightly dizzy   This is more pronounced prior to menstrual period   Otherwise well         History/Other:   Review of Systems    Constitutional: Negative. Negative for activity change, appetite change, diaphoresis and fatigue. Respiratory: Negative. Negative for apnea, cough, chest tightness and shortness of breath. Cardiovascular: Negative. Negative for chest pain, palpitations and leg swelling. Gastrointestinal: Negative. Negative for abdominal pain. Skin: Negative. Psychiatric/Behavioral: Negative. No current outpatient medications on file. Allergies:No Known Allergies    Objective:   Physical Exam  Constitutional:       Appearance: She is well-developed. Cardiovascular:      Rate and Rhythm: Normal rate and regular rhythm. Heart sounds: Normal heart sounds. Pulmonary:      Effort: Pulmonary effort is normal.      Breath sounds: Normal breath sounds. Abdominal:      General: Bowel sounds are normal.      Palpations: Abdomen is soft. Neurological:      Mental Status: She is alert. Deep Tendon Reflexes: Reflexes are normal and symmetric. Assessment & Plan:   1. Annual physical exam    2. Screening for colon cancer    3.  Dizziness    Will f/u with blood test   Diet and exercise discussed    Orders Placed This Encounter   Procedures    Comp Metabolic Panel (14)    Lipid Panel    CBC With Differential With Platelet    Vitamin D [E]    Vitamin B12    Fluzone Quadrivalent 6mo+ 0.5mL       Meds This Visit:  Requested Prescriptions      No prescriptions requested or ordered in this encounter       Imaging & Referrals:  INFLUENZA VACCINE, QUAD, PRESERVATIVE FREE, 0.5 ML  OP REFERRAL TO [facility] GI TELEPHONE COLON SCREEN  EKG 12-LEAD

## 2023-12-15 ENCOUNTER — HOSPITAL ENCOUNTER (OUTPATIENT)
Dept: MAMMOGRAPHY | Facility: HOSPITAL | Age: 48
Discharge: HOME | End: 2023-12-15
Attending: FAMILY MEDICINE
Payer: COMMERCIAL

## 2023-12-15 DIAGNOSIS — R92.8 ABNORMAL MAMMOGRAM: ICD-10-CM

## 2023-12-15 PROCEDURE — 77062 BREAST TOMOSYNTHESIS BI: CPT | Performed by: FAMILY MEDICINE

## 2023-12-15 PROCEDURE — 77066 DX MAMMO INCL CAD BI: CPT | Performed by: FAMILY MEDICINE

## 2024-01-17 ENCOUNTER — TELEPHONE (OUTPATIENT)
Dept: FAMILY MEDICINE CLINIC | Facility: CLINIC | Age: 49
End: 2024-01-17

## 2024-01-17 NOTE — TELEPHONE ENCOUNTER
Patient called stating she needs a form completed for her employer from her most recent physical. I provided the fax#.

## 2024-01-22 NOTE — TELEPHONE ENCOUNTER
Patient states she sent her form via fax last week. She is checking the status on the completion of this. Per patient she states that she needs this faxed back ASAP or else she cannot work. She states the form is double sided    Please fax form back to:    Fax#: [phone]

## 2024-01-24 ENCOUNTER — MED REC SCAN ONLY (OUTPATIENT)
Dept: FAMILY MEDICINE CLINIC | Facility: CLINIC | Age: 49
End: 2024-01-24

## 2024-02-02 ENCOUNTER — NURSE ONLY (OUTPATIENT)
Facility: CLINIC | Age: 49
End: 2024-02-02

## 2024-02-02 DIAGNOSIS — Z12.11 SCREEN FOR COLON CANCER: Primary | ICD-10-CM

## 2024-02-02 DIAGNOSIS — Z80.0 FH: COLON CANCER: ICD-10-CM

## 2024-02-02 NOTE — PROGRESS NOTES
GI Staff:  [facility] Colon Screening Orders    Please schedule: Colonoscopy 02756 / 27096 with MAC OR IV (if appropriate)    Please send split dose Golytely bowel prep     Diagnosis: Colon Screening Z12.11 /  Family history of colon cancer Z80.0 /     >>>Please inform patient if new medications are started after scheduling procedure they need to call clinic to notify us.

## 2024-02-02 NOTE — PROGRESS NOTES
Called patient for scheduled telephone colon screening.   Medications, pharmacy, and allergies verified with the patient.   Age 45-64 y/o (Y/N):   66-76 y/o ? Route to GI provider per rotation schedule   › GI MD preference:   › Insurance:  [place]  › Last PCP Visit:  12/12/2023  › Last CBC drawn: 12/12/2023  › H/W/BMI: 5'4\" / 200LBS / 33.81    Special comments/notes:  I asked patient about symptoms on GI colon screen and she denied them.  She stated she has intermittent constipation but it is currently under control.  I offered patient an OV but she declined.  Telephone colon screening Questionnaires:  Yes No   Are you currently experiencing any new GI symptoms? [] [x]   If yes, symptom details:     Rectal Bleeding with or without bowel movements: [] [x]   Black stool: [] [x]   Dysphagia &Food feeling/getting stuck: [] [x]   Intractable Vomiting: [] [x]   Unexplained weight loss: [] [x]   First colonoscopy? [x] []   Family history of colon cancer?  Paternal grandmother [x] []   Any issues with anesthesia? [] [x]   If yes, explain details:      Personal history of Resp. Issues/Oxygen Use/ROGER/COPD? [] [x]   If yes, CPAP/BiPAP? [] [x]   History of devices Pacemaker/Defibrillator/Stents? [] [x]   History of Cardiac/CVA issues/(MI/Stroke):  [] [x]     Medication usage:  Yes  No   Anticoagulants:  Anticoagulant (Except Aspirin) ? Route to RN staff to obtain ordering provider orders [] [x]   Diabetic Meds:   PO DM Meds ? Hold day prior and day of procedure  Insulin ? Route to RN clinical staff to obtain provider orders  [] [x]   Weight loss meds (phentermine/Vyvanse/Saxsenda): [] [x]   Iron/Herbal/Multivitamin Supplement (RX/OTC):  Allergy, vitamin D, multivitamin, fish oil [x] []   Usage of marijuana, CBD &/or vape products: [] [x]

## 2024-02-13 NOTE — PROGRESS NOTES
Scheduled for:  Colonoscopy 76770/[id number]  Provider Name:  Dr. Veloz   Date:  05/08/2024  Location:[facility]  Sedation:  MAC  Time:  1:15pm (Patient is aware arrival time is at 12:15pm)  Prep:  Trilyte Prep Instructions Given At The Office Visit.    Meds/Allergies Reconciled?:  Reviewed   Diagnosis with codes:  Colon Screening Z12.11/ Family Hx of Colon Cancer Z80.0  Was patient informed to call insurance with codes (Y/N):  Yes  Referral sent?:  Referral was sent at the time of electronic surgical scheduling.  EM or [facility] notified?:  I sent an electronic request to Endo Scheduling and received a confirmation today.  Medication Orders:  Pt is aware to NOT take iron pills, herbal meds and diet supplements for 7 days before exam. Also to NOT take any form of alcohol, recreational drugs and any forms of ED meds 24 hours before exam.   Misc Orders:       Further instructions given by staff:  I provide prep instructions to patient at the time of the appointment and reviewed date, time and location, she verbalized that she  understood and is aware to call if she has any questions.    Patient was informed about the new cancellation policy for his/her procedure. Patient was also given a copy of the cancellation policy at the time of the appointment and verbalized understanding.

## 2024-05-02 ENCOUNTER — TELEPHONE (OUTPATIENT)
Facility: CLINIC | Age: 49
End: 2024-05-02

## 2024-05-02 NOTE — TELEPHONE ENCOUNTER
Patient calling states preparation has not been sent yet, already has called pharmacy. Please call.

## 2024-05-08 ENCOUNTER — ANESTHESIA (OUTPATIENT)
Dept: ENDOSCOPY | Age: 49
End: 2024-05-08
Payer: COMMERCIAL

## 2024-05-08 ENCOUNTER — HOSPITAL ENCOUNTER (OUTPATIENT)
Age: 49
Setting detail: HOSPITAL OUTPATIENT SURGERY
Discharge: HOME | End: 2024-05-08
Attending: INTERNAL MEDICINE | Admitting: INTERNAL MEDICINE
Payer: COMMERCIAL

## 2024-05-08 ENCOUNTER — ANESTHESIA EVENT (OUTPATIENT)
Dept: ENDOSCOPY | Age: 49
End: 2024-05-08
Payer: COMMERCIAL

## 2024-05-08 VITALS
HEART RATE: 65 BPM | SYSTOLIC BLOOD PRESSURE: 150 MMHG | DIASTOLIC BLOOD PRESSURE: 81 MMHG | HEIGHT: 65 IN | BODY MASS INDEX: 31.65 KG/M2 | WEIGHT: 190 LBS | OXYGEN SATURATION: 95 % | RESPIRATION RATE: 13 BRPM | TEMPERATURE: 97 F

## 2024-05-08 DIAGNOSIS — Z12.11 SCREEN FOR COLON CANCER: ICD-10-CM

## 2024-05-08 DIAGNOSIS — Z80.0 FH: COLON CANCER: ICD-10-CM

## 2024-05-08 PROBLEM — K64.8 INTERNAL HEMORRHOIDS: Status: ACTIVE | Noted: 2024-05-08

## 2024-05-08 RX ORDER — SODIUM CHLORIDE, SODIUM LACTATE, POTASSIUM CHLORIDE, CALCIUM CHLORIDE 600; 310; 30; 20 MG/100ML; MG/100ML; MG/100ML; MG/100ML
INJECTION, SOLUTION INTRAVENOUS CONTINUOUS
Status: DISCONTINUED | OUTPATIENT
Start: 2024-05-08 | End: 2024-05-08

## 2024-05-08 RX ORDER — LIDOCAINE HYDROCHLORIDE 10 MG/ML
INJECTION, SOLUTION EPIDURAL; INFILTRATION; INTRACAUDAL; PERINEURAL AS NEEDED
Status: DISCONTINUED | OUTPATIENT
Start: 2024-05-08 | End: 2024-05-08 | Stop reason: SURG

## 2024-05-08 RX ORDER — NALOXONE HYDROCHLORIDE 0.4 MG/ML
0.08 INJECTION, SOLUTION INTRAMUSCULAR; INTRAVENOUS; SUBCUTANEOUS ONCE AS NEEDED
Status: DISCONTINUED | OUTPATIENT
Start: 2024-05-08 | End: 2024-05-08

## 2024-05-08 RX ADMIN — LIDOCAINE HYDROCHLORIDE 40 MG: 10 INJECTION, SOLUTION EPIDURAL; INFILTRATION; INTRACAUDAL; PERINEURAL at 13:09:00

## 2024-05-08 RX ADMIN — SODIUM CHLORIDE, SODIUM LACTATE, POTASSIUM CHLORIDE, CALCIUM CHLORIDE: 600; 310; 30; 20 INJECTION, SOLUTION INTRAVENOUS at 13:07:00

## 2024-05-08 NOTE — ANESTHESIA PREPROCEDURE EVALUATION
Anesthesia PreOp Note    HPI:     Shabana Ortega is a 48 year old female who presents for preoperative consultation requested by: RUBY Veloz MD    Date of Surgery: 2024    Procedure(s):  COLONOSCOPY  Indication: Screen for colon cancer   FH: colon cancer    Relevant Problems   No relevant active problems       NPO:  Last Liquid Consumption Date: 24  Last Liquid Consumption Time: 0915  Last Solid Consumption Date: 24  Last Solid Consumption Time: 0600  Last Liquid Consumption Date: 24          History Review:  There are no problems to display for this patient.      History reviewed. No pertinent past medical history.    History reviewed. No pertinent surgical history.    Medications Prior to Admission   Medication Sig Dispense Refill Last Dose   • [] polyethylene glycol, PEG 3350-KCl-NaBcb-NaCl-NaSulf, 236 g Oral Recon Soln Take 4,000 mL by mouth once for 1 dose. Take as directed by your Gastroenterology clinic. 4000 mL 0      Current Facility-Administered Medications Ordered in Epic   Medication Dose Route Frequency Provider Last Rate Last Admin   • lactated ringers infusion   Intravenous Continuous RUBY Veloz MD         No current [place]-ordered outpatient medications on file.       No Known Allergies    Family History   Problem Relation Age of Onset   • Hypertension Mother    • Hypertension Brother    • Pancreatic Cancer Paternal Grandmother         30s   • Cancer Paternal Grandmother    • Cancer Paternal Grandfather         lung cancer     Social History     Socioeconomic History   • Marital status:    Tobacco Use   • Smoking status: Former   • Smokeless tobacco: Never   Vaping Use   • Vaping status: Never Used   Substance and Sexual Activity   • Alcohol use: Yes     Comment: occ   • Drug use: Never       Available pre-op labs reviewed.             Vital Signs:  Body mass index is 31.62 kg/m².   height is 1.651 m (5' 5\") and weight is 86.2 kg (190 lb). Her blood  pressure is 131/68 and her pulse is 71. Her respiration is 19 and oxygen saturation is 99%.   Vitals:    05/02/24 1027 05/08/24 1229   BP:  131/68   Pulse:  71   Resp:  19   SpO2:  99%   Weight: 89.8 kg (198 lb) 86.2 kg (190 lb)   Height: 1.651 m (5' 5\") 1.651 m (5' 5\")        Anesthesia Evaluation     Patient summary reviewed and Nursing notes reviewed    Airway   Mallampati: I  TM distance: >3 FB  Neck ROM: full  Dental - Dentition appears grossly intact     Pulmonary - negative ROS and normal exam   Cardiovascular - negative ROS and normal exam  Exercise tolerance: good    Neuro/Psych - negative ROS     GI/Hepatic/Renal - negative ROS     Endo/Other - negative ROS   Abdominal   (+) obese               Anesthesia Plan:   ASA:  1  Plan:   General  Informed Consent Plan and Risks Discussed With:  Patient  Discussed plan with:  Surgeon      I have informed Shabana Munroetrand and/or legal guardian or family member of the nature of the anesthetic plan, benefits, risks including possible dental damage if relevant, major complications, and any alternative forms of anesthetic management.   All of the patient's questions were answered to the best of my ability. The patient desires the anesthetic management as planned.  Shanna Witt CRNA  5/8/2024 12:42 PM  Present on Admission:  **None**

## 2024-05-08 NOTE — ANESTHESIA POSTPROCEDURE EVALUATION
Patient: Shabana Ortega    Procedure Summary       Date: 05/08/24 Room / Location: [facility] ENDOSCOPY 02 / [facility] ENDO    Anesthesia Start: 1307 Anesthesia Stop: 1330    Procedure: COLONOSCOPY Diagnosis:       Screen for colon cancer      FH: colon cancer      (hemorrhoids)    Surgeons: RUBY Veloz MD Anesthesiologist:     Anesthesia Type: general ASA Status: 1            Anesthesia Type: general    Vitals Value Taken Time   BP 92/71 05/08/24 1330   Temp 97.2 °F (36.2 °C) 05/08/24 1330   Pulse 59 05/08/24 1330   Resp 14 05/08/24 1330   SpO2 99 % 05/08/24 1330       [facility] AN Post Evaluation:   Patient Evaluated in PACU  Patient Participation: complete - patient participated  Level of Consciousness: awake and alert  Pain Score: 0  Pain Management: adequate  Airway Patency:patent  Dental exam unchanged from preop  Yes    Cardiovascular Status: acceptable  Respiratory Status: acceptable  Postoperative Hydration acceptable    Shanna Witt CRNA  5/8/2024 1:31 PM

## 2024-05-08 NOTE — DISCHARGE INSTRUCTIONS
Home Care Instructions for Colonoscopy with Sedation    Diet:  - Resume your regular diet as tolerated unless otherwise instructed.  - Start with light meals to minimize bloating.  - Do not drink alcohol today.    Medication:  - If you have questions about resuming your normal medications, please contact your Primary Care Physician.    Activities:  - Take it easy today. Do not return to work today.  - Do not drive today.  - Do not operate any machinery today (including kitchen equipment).    Colonoscopy:  - You may notice some rectal \"spotting\" (a little blood on the toilet tissue) for a day or two after the exam. This is normal.  - If you experience any rectal bleeding (not spotting), persistent tenderness or sharp severe abdominal pains, oral temperature over 100 degrees Fahrenheit, light-headedness or dizziness, or any other problems, contact your doctor.    **If unable to reach your doctor, please go to the [facility] Emergency Room**    - Your referring physician will receive a full report of your examination.  - If you do not hear from your doctor's office within two weeks of your biopsy, please call them for your results.    You may be able to see your laboratory results in [employer] between 4 and 7 business days.  In some cases, your physician may not have viewed the results before they are released to [employer].  If you have questions regarding your results contact the physician who ordered the test/exam by phone or via [employer] by choosing \"Ask a Medical Question.\"

## 2024-05-08 NOTE — H&P
"Here for follow up of tobacco abuse and abnormal CT lung cancer screening     Lung nodules:  Small pulmonary nodules measuring up to 3 mm. Â   Lung-RADS category:  Lung-RADS 2 - Benign appearance or behavior. Nodules  with a very low likelihood of becoming a clinically active cancer due to  size or lack of growth. Â   Recommendation: Continue annual screening with low-dose CT as long as the  patient continues to meet screening criteria. Â   Incidental findings on screening CT:    1. Moderate to severe centrilobular emphysema. 2. Severe coronary artery calcification. 3. Few prominent mediastinal lymph nodes which are nonspecific but could be  Reactive. Has been checking BP at [facility] and they have been < 130   She has been on lisinopril 10 mg     She continues to smoke 1 pack a day   Visit Vitals  /85   Pulse 67   Temp 96.8 Â°F (36 Â°C) (Oral)   Ht 5' 6"" (1.676 m)   Wt 61.3 kg   BMI 21.79 kg/mÂ²     Constitutional:  A+O x 3. In NAD. Cardiovascular:  Normal rate. Normal rhythm. No murmurs, gallops, or rubs. Respiratory:  No respiratory distress. Normal breath sounds. No rales. No wheezing.     ASSESSMENT AND PLAN:  1)hypertension : has better numbers at [employer] , will add HCTZ 12.5 mg daily   2)severe coronary artery disease on recent ct [place]   Will do echo and stress test   3) tobacco abuse will do wellbutrin 150 mg bid for 3 months     4) old lacunar infarct needs to stop smoking , has imbalance but is not very significant     Needs to keep BP lower   Needs lower lipids below < 70         " History & Physical Examination    Patient Name: Shabana Ortega  MRN: [id number]  [facility]: [id number]  YOB: 1975    Diagnosis: screening for colon cancer    ,Patient currently denies any GI symptoms of nausea, vomiting, dyspepsia, dysphagia, hematemesis, abdominal pain, change in bowel habits, thin stools, hematochezia, or melena.  Additionally there is no weight loss and no reported history of chest pain or shortness of breath.    No first degree members of family with crc  Medications Prior to Admission   Medication Sig Dispense Refill Last Dose    [] polyethylene glycol, PEG 3350-KCl-NaBcb-NaCl-NaSulf, 236 g Oral Recon Soln Take 4,000 mL by mouth once for 1 dose. Take as directed by your Gastroenterology clinic. 4000 mL 0      Current Facility-Administered Medications   Medication Dose Route Frequency    lactated ringers infusion   Intravenous Continuous     Facility-Administered Medications Ordered in Other Encounters   Medication Dose Route Frequency    lidocaine PF (Xylocaine-MPF) 1% injection   Intravenous PRN    propofol (Diprivan) 10 MG/ML injection   Intravenous PRN    propofol (Diprivan) 10 mg/mL infusion premix   Intravenous Continuous PRN       Allergies: No Known Allergies    History reviewed. No pertinent past medical history.  History reviewed. No pertinent surgical history.  Family History   Problem Relation Age of Onset    Hypertension Mother     Hypertension Brother     Pancreatic Cancer Paternal Grandmother         30s    Cancer Paternal Grandmother     Cancer Paternal Grandfather         lung cancer     Social History     Tobacco Use    Smoking status: Former    Smokeless tobacco: Never   Substance Use Topics    Alcohol use: Yes     Comment: occ       SYSTEM Check if Review is Normal Check if Physical Exam is Normal If not normal, please explain:   HEENT [X ] [ X]    NECK  [X ] [ X]    HEART [X ] [ X]    LUNGS [X ] [ X]    ABDOMEN [X ] [ X]    EXTREMITIES [X ] [ X]    OTHER         I have discussed the risks and benefits and alternatives of the procedure with the patient/family.  They understand and agree to proceed with plan of care.   I have reviewed the History and Physical done within the last 30 days.  Any changes noted above.    LAURYN Veloz MD  [facility] - Gastroenterology  5/8/2024  1:13 PM

## 2024-05-08 NOTE — OPERATIVE REPORT
COLONOSCOPY REPORT    Shabana Ortega     1975 Age 48 year old   PCP Carmelina Ramirez MD Endoscopist Shahida Veloz MD     Date of procedure: 24    Procedure: Colonoscopy     Pre-operative diagnosis: Screening    Post-operative diagnosis: Internal hemorrhoids    Medications: MAC    Withdrawal time: 10 minutes    Procedure:  Informed consent was obtained from the patient after the risks of the procedure were discussed, including but not limited to bleeding, perforation, aspiration, infection, or possibility of a missed lesion. After discussions of the risks/benefits and alternatives to this procedure, as well as the planned sedation, the patient was placed in the left lateral decubitus position and begun on continuous blood pressure pulse oximetry and EKG monitoring and this was maintained throughout the procedure. Once an adequate level of sedation was obtained a digital rectal exam was completed. Then the lubricated tip of the [id number] diagnostic video colonoscope was inserted and advanced without difficulty to the cecum using the CO2 insufflation technique. The cecum was identified by localizing the trifold, the appendix and the ileocecal valve. Withdrawal was begun with thorough washing and careful examination of the colonic walls and folds. A routine second examination of the cecum/ascending colon was performed. Photodocumentation was obtained. The bowel prep was good. Views of the colon were good with washing. I then carefully withdrew the instrument from the patient who tolerated the procedure well.     Complications: none.    Findings:   1. NO polyp(s) noted.    2. Diverticulosis: none.    3. Terminal ileum: the visualized mucosa appeared normal.    4. The colonic mucosa throughout the colon showed normal vascular pattern, without evidence of angioectasias or inflammation.     5. A retroflexed view of the rectum revealed small internal hemorrhoids.    6. KIM: normal rectal tone, no  masses palpated.     Impression:   Normal colonoscopy to the terminal ileum, other than small internal hemorrhoids.     Recommend:  Repeat CLN in 10 years. If new signs or symptoms develop, colonoscopy may need to be repeated sooner.   High fiber diet.    Specimens: none  Blood loss: <1 ml

## 2024-05-10 ENCOUNTER — TELEPHONE (OUTPATIENT)
Facility: CLINIC | Age: 49
End: 2024-05-10

## 2024-05-10 NOTE — TELEPHONE ENCOUNTER
Health maintenance updated. Last colonoscopy done 5/8/24. 10 year recall placed into [facility], next due on 05/2034 per Dr. Veloz.

## (undated) DEVICE — KIT CLEAN ENDOKIT 1.1OZ GOWNX2

## (undated) DEVICE — KIT ENDO ORCAPOD 160/180/190

## (undated) DEVICE — 60 ML SYRINGE REGULAR TIP: Brand: MONOJECT

## (undated) DEVICE — Device: Brand: DUAL NARE NASAL CANNULAE FEMALE LUER CON 7FT O2 TUBE

## (undated) DEVICE — MEDI-VAC NON-CONDUCTIVE SUCTION TUBING 6MM X 1.8M (6FT.) L: Brand: CARDINAL HEALTH

## (undated) NOTE — LETTER
[facility]  Administration of Anesthesia  Shabana RUDOLPH agree to be cared for by a physician anesthesiologist alone and/or with a nurse anesthetist, who is specially trained to monitor me and give me medicine to put me to sleep or keep me comfortable during my procedure    I understand that my anesthesiologist and/or anesthetist is not an employee or agent of [facility] or [employer]. He or she works for [facility]    As the patient asking for anesthesia services, I agree to:  Allow the anesthesiologist (anesthesia doctor) to give me medicine and do additional procedures as necessary. Some examples are: Starting or using an “IV” to give me medicine, fluids or blood during my procedure, and having a breathing tube placed to help me breathe when I’m asleep (intubation). In the event that my heart stops working properly, I understand that my anesthesiologist will make every effort to sustain my life, unless otherwise directed by [facility] Do Not Resuscitate documents.  Tell my anesthesia doctor before my procedure:  If I am pregnant.  The last time that I ate or drank.  iii. All of the medicines I take (including prescriptions, herbal supplements, and pills I can buy without a prescription (including street drugs/illegal medications). Failure to inform my anesthesiologist about these medicines may increase my risk of anesthetic complications.  iv.If I am allergic to anything or have had a reaction to anesthesia before.  I understand how the anesthesia medicine will help me (benefits).  I understand that with any type of anesthesia medicine there are risks:  The most common risks are: nausea, vomiting, sore throat, muscle soreness, damage to my eyes, mouth, or teeth (from breathing tube placement).  Rare risks include: remembering what happened during my procedure, allergic reactions to medications, injury to my airway, heart, lungs, vision, nerves, or  muscles and in extremely rare instances death.  My doctor has explained to me other choices available to me for my care (alternatives).  Pregnant Patients (“epidural”):  I understand that the risks of having an epidural (medicine given into my back to help control pain during labor), include itching, low blood pressure, difficulty urinating, headache or slowing of the baby’s heart. Very rare risks include infection, bleeding, seizure, irregular heart rhythms and nerve injury.  Regional Anesthesia (“spinal”, “epidural”, & “nerve blocks”):  I understand that rare but potential complications include headache, bleeding, infection, seizure, irregular heart rhythms, and nerve injury.    _____________________________________________________________________________  Patient (or Representative) Signature/Relationship to Patient  Date   Time    _____________________________________________________________________________   Name (if used)    Language/Organization   Time    _____________________________________________________________________________  Nurse Anesthetist Signature     Date   Time  _____________________________________________________________________________  Anesthesiologist Signature     Date   Time  I have discussed the procedure and information above with the patient (or patient’s representative) and answered their questions. The patient or their representative has agreed to have anesthesia services.    _____________________________________________________________________________  Witness        Date   Time  I have verified that the signature is that of the patient or patient’s representative, and that it was signed before the procedure  Patient Name: Shabana Ortega     : 1975                 Printed: 2024 at 7:41 AM    Medical Record #: [id number]                                            Page 1 of 1  ----------ANESTHESIA CONSENT----------

## (undated) NOTE — LETTER
[facility]  [address], [place], [place]  Authorization for Surgical Operation and Procedure                                                                                           I hereby authorize RUBY Veloz MD, my physician and his/her assistants (if applicable), which may include medical students, residents, and/or fellows, to perform the following surgical operation/ procedure and administer such anesthesia as may be determined necessary by my physician: Operation/Procedure name (s) COLONOSCOPY on Shabana Ortega   2.   I recognize that during the surgical operation/procedure, unforeseen conditions may necessitate additional or different procedures than those listed above.  I, therefore, further authorize and request that the above-named surgeon, assistants, or designees perform such procedures as are, in their judgment, necessary and desirable.    3.   My surgeon/physician has discussed prior to my surgery the potential benefits, risks and side effects of this procedure; the likelihood of achieving goals; and potential problems that might occur during recuperation.  They also discussed reasonable alternatives to the procedure, including risks, benefits, and side effects related to the alternatives and risks related to not receiving this procedure.  I have had all my questions answered and I acknowledge that no guarantee has been made as to the result that may be obtained.    4.   Should the need arise during my operation/procedure, which includes change of level of care prior to discharge, I also consent to the administration of blood and/or blood products.  Further, I understand that despite careful testing and screening of blood or blood products by collecting agencies, I may still be subject to ill effects as a result of receiving a blood transfusion and/or blood products.  The following are some, but not all, of the potential risks that can occur: fever and allergic  reactions, hemolytic reactions, transmission of diseases such as Hepatitis, AIDS and Cytomegalovirus (CMV) and fluid overload.  In the event that I wish to have an autologous transfusion of my own blood, or a directed donor transfusion, I will discuss this with my physician.  Check only if Refusing Blood or Blood Products  I understand refusal of blood or blood products as deemed necessary by my physician may have serious consequences to my condition to include possible death. I hereby assume responsibility for my refusal and release the hospital, its personnel, and my physicians from any responsibility for the consequences of my refusal.    o  Refuse   5.   I authorize the use of any specimen, organs, tissues, body parts or foreign objects that may be removed from my body during the operation/procedure for diagnosis, research or teaching purposes and their subsequent disposal by hospital authorities.  I also authorize the release of specimen test results and/or written reports to my treating physician on the hospital medical staff or other referring or consulting physicians involved in my care, at the discretion of the Pathologist or my treating physician.    6.   I consent to the photographing or videotaping of the operations or procedures to be performed, including appropriate portions of my body for medical, scientific, or educational purposes, provided my identity is not revealed by the pictures or by descriptive texts accompanying them.  If the procedure has been photographed/videotaped, the surgeon will obtain the original picture, image, videotape or CD.  The hospital will not be responsible for storage, release or maintenance of the picture, image, tape or CD.    7.   I consent to the presence of a  or observers in the operating room as deemed necessary by my physician or their designees.    8.   I recognize that in the event my procedure results in extended X-Ray/fluoroscopy time, I may  develop a skin reaction.    9. If I have a Do Not Attempt Resuscitation (DNAR) order in place, that status will be suspended while in the operating room, procedural suite, and during the recovery period unless otherwise explicitly stated by me (or a person authorized to consent on my behalf). The surgeon or my attending physician will determine when the applicable recovery period ends for purposes of reinstating the DNAR order.  10. Patients having a sterilization procedure: I understand that if the procedure is successful the results will be permanent and it will therefore be impossible for me to inseminate, conceive, or bear children.  I also understand that the procedure is intended to result in sterility, although the result has not been guaranteed.   11. I acknowledge that my physician has explained sedation/analgesia administration to me including the risk and benefits I consent to the administration of sedation/analgesia as may be necessary or desirable in the judgment of my physician.    I CERTIFY THAT I HAVE READ AND FULLY UNDERSTAND THE ABOVE CONSENT TO OPERATION and/or OTHER PROCEDURE.     _________________________________________ _________________________________     ___________________________________  Signature of Patient     Signature of Responsible Person                   Printed Name of Responsible Person                              _________________________________________ ______________________________        ___________________________________  Signature of Witness         Date  Time         Relationship to Patient    STATEMENT OF PHYSICIAN My signature below affirms that prior to the time of the procedure; I have explained to the patient and/or his/her legal representative, the risks and benefits involved in the proposed treatment and any reasonable alternative to the proposed treatment. I have also explained the risks and benefits involved in refusal of the proposed treatment and alternatives  to the proposed treatment and have answered the patient's questions. If I have a significant financial interest in a co-management agreement or a significant financial interest in any product or implant, or other significant relationship used in this procedure/surgery, I have disclosed this and had a discussion with my patient.     _______________________________________________________________ _____________________________  (Signature of Physician)                                                                                         (Date)                                   (Time)  Patient Name: Shabana Ortega    : 1975   Printed: 2024      Medical Record #: [id number]                                              Page 1 of 1